# Patient Record
Sex: FEMALE | URBAN - METROPOLITAN AREA
[De-identification: names, ages, dates, MRNs, and addresses within clinical notes are randomized per-mention and may not be internally consistent; named-entity substitution may affect disease eponyms.]

---

## 2019-06-18 ENCOUNTER — APPOINTMENT (OUTPATIENT)
Age: 54
Setting detail: DERMATOLOGY
End: 2019-06-19

## 2019-06-18 DIAGNOSIS — L259 CONTACT DERMATITIS AND OTHER ECZEMA, UNSPECIFIED CAUSE: ICD-10-CM

## 2019-06-18 DIAGNOSIS — H01.13 ECZEMATOUS DERMATITIS OF EYELID: ICD-10-CM

## 2019-06-18 PROBLEM — H01.131 ECZEMATOUS DERMATITIS OF RIGHT UPPER EYELID: Status: ACTIVE | Noted: 2019-06-18

## 2019-06-18 PROBLEM — L23.9 ALLERGIC CONTACT DERMATITIS, UNSPECIFIED CAUSE: Status: ACTIVE | Noted: 2019-06-18

## 2019-06-18 PROBLEM — H01.134 ECZEMATOUS DERMATITIS OF LEFT UPPER EYELID: Status: ACTIVE | Noted: 2019-06-18

## 2019-06-18 PROCEDURE — 99214 OFFICE O/P EST MOD 30 MIN: CPT

## 2019-06-18 PROCEDURE — OTHER MIPS QUALITY: OTHER

## 2019-06-18 PROCEDURE — OTHER PRESCRIPTION: OTHER

## 2019-06-18 PROCEDURE — OTHER TREATMENT REGIMEN: OTHER

## 2019-06-18 PROCEDURE — OTHER COUNSELING: OTHER

## 2019-06-18 RX ORDER — HYDROCORTISONE 25 MG/G
CREAM TOPICAL
Qty: 1 | Refills: 0 | Status: ERX | COMMUNITY
Start: 2019-06-18

## 2019-06-18 RX ORDER — EMOLLIENT COMBINATION NO.32
EMULSION, EXTENDED RELEASE TOPICAL
Qty: 1 | Refills: 2 | Status: ERX | COMMUNITY
Start: 2019-06-18

## 2019-06-18 ASSESSMENT — LOCATION DETAILED DESCRIPTION DERM
LOCATION DETAILED: RIGHT LATERAL SUPERIOR EYELID
LOCATION DETAILED: LEFT MEDIAL SUPERIOR EYELID

## 2019-06-18 ASSESSMENT — LOCATION ZONE DERM: LOCATION ZONE: EYELID

## 2019-06-18 ASSESSMENT — LOCATION SIMPLE DESCRIPTION DERM
LOCATION SIMPLE: LEFT SUPERIOR EYELID
LOCATION SIMPLE: RIGHT SUPERIOR EYELID

## 2019-06-18 NOTE — PROCEDURE: TREATMENT REGIMEN
Initiate Treatment: hydrocortisone 2.5 % topical cream \\nSig: Apply to AA on eyelid BID for 1-2 weeks, then once a day for 1 week, then QOD then discontinue and then reserve for flares\\nEpiceram cream BID-TID\\n\\nEpiCeram topical emulsion, extended release *sent to [facility] \\nSig: Apply to affected areas on face 2-3X a day as needed Initiate Treatment: hydrocortisone 2.5 % topical cream \\nSig: Apply to AA on eyelid BID for 1-2 weeks, then once a day for 1 week, then QOD then discontinue and then reserve for flares\\nEpiceram cream BID-TID\\n\\nEpiCeram topical emulsion, extended release *sent to [facility] \\nSig: Apply to affected areas on face 2-3X a day as needed

## 2019-06-18 NOTE — PROCEDURE: TREATMENT REGIMEN
Modify Regimen: Do not do any more chemical peels or other ski treatments while her eyelid dermatitis is flaring

## 2019-06-18 NOTE — PROCEDURE: TREATMENT REGIMEN
Plan: Per patient she started a new eye shadow palette in March with \"nathan\" colors and the eyelid dermatitis started then. She has since switched back to her MAC eye shadows that she normally used. She is peeling today from a recent chemical peel (she works in a [employer] office). She was instructed to not do chemical peels since she has a rash on her face.\\Mike has her nails done at a nail salon. If the eyelid dermatitis doesn't improve I instructed her to remove the nails. She understood.\\n\\nI advised the patient that Eyelid Dermatitis can be challenging to control. They can flare form allergies, stress, or contact allergens. She was advised to take antihistamines daily, use only mild cleansers and moisturizers and for her face and body,and to use the Epiceram and HC cream in a tapered fashion as prescribed. If not getting any better I would suggest she see an Allergist for allergy testing and PATCH testing next. Recommendations given to her (she lives in [place] so will look for someone closer to her) for allergists. All questions answered. F/U in 6 weeks Plan: Per patient she started a new eye shadow palette in March with \"nathan\" colors and the eyelid dermatitis started then. She has since switched back to her MAC eye shadows that she normally used. She is peeling today from a recent chemical peel (she works in a [employer] office). She was instructed to not do chemical peels since she has a rash on her face.\\Mike has her nails done at a nail salon. If the eyelid dermatitis doesn't improve I instructed her to remove the nails. She understood.\\n\\nI advised the patient that Eyelid Dermatitis can be challenging to control. They can flare form allergies, stress, or contact allergens. She was advised to take antihistamines daily, use only mild cleansers and moisturizers and for her face and body,and to use the Epiceram and HC cream in a tapered fashion as prescribed. If not getting any better I would suggest she see an Allergist for allergy testing and PATCH testing next. Recommendations given to her (she lives in [place] so will look for someone closer to her) for allergists. All questions answered. F/U in 6 weeks

## 2020-01-15 ENCOUNTER — APPOINTMENT (OUTPATIENT)
Age: 55
Setting detail: DERMATOLOGY
End: 2020-01-16

## 2020-01-15 DIAGNOSIS — L92.0 GRANULOMA ANNULARE: ICD-10-CM

## 2020-01-15 DIAGNOSIS — D485 NEOPLASM OF UNCERTAIN BEHAVIOR OF SKIN: ICD-10-CM

## 2020-01-15 DIAGNOSIS — L40.8 OTHER PSORIASIS: ICD-10-CM

## 2020-01-15 PROBLEM — D48.5 NEOPLASM OF UNCERTAIN BEHAVIOR OF SKIN: Status: ACTIVE | Noted: 2020-01-15

## 2020-01-15 PROCEDURE — OTHER BIOPSY BY SHAVE METHOD: OTHER

## 2020-01-15 PROCEDURE — 99213 OFFICE O/P EST LOW 20 MIN: CPT | Mod: 25

## 2020-01-15 PROCEDURE — OTHER MIPS QUALITY: OTHER

## 2020-01-15 PROCEDURE — OTHER COUNSELING: OTHER

## 2020-01-15 PROCEDURE — OTHER TREATMENT REGIMEN: OTHER

## 2020-01-15 PROCEDURE — 11102 TANGNTL BX SKIN SINGLE LES: CPT

## 2020-01-15 ASSESSMENT — SEVERITY ASSESSMENT: SEVERITY: MILD

## 2020-01-15 ASSESSMENT — LOCATION DETAILED DESCRIPTION DERM
LOCATION DETAILED: LEFT ANTERIOR DISTAL UPPER ARM
LOCATION DETAILED: RIGHT ANTERIOR DISTAL UPPER ARM
LOCATION DETAILED: RIGHT LATERAL ABDOMEN

## 2020-01-15 ASSESSMENT — PGA PSORIASIS: PGA PSORIASIS: MINIMAL (MINIMAL PLAQUE ELEVATION, FAINT ERYTHEMA, OCCASIONAL FINE SCALE)

## 2020-01-15 ASSESSMENT — LOCATION ZONE DERM
LOCATION ZONE: ARM
LOCATION ZONE: TRUNK

## 2020-01-15 ASSESSMENT — LOCATION SIMPLE DESCRIPTION DERM
LOCATION SIMPLE: RIGHT UPPER ARM
LOCATION SIMPLE: LEFT UPPER ARM
LOCATION SIMPLE: ABDOMEN

## 2020-01-15 ASSESSMENT — PAIN INTENSITY VAS: HOW INTENSE IS YOUR PAIN 0 BEING NO PAIN, 10 BEING THE MOST SEVERE PAIN POSSIBLE?: NO PAIN

## 2020-01-15 ASSESSMENT — BSA RASH: BSA RASH: 2

## 2020-01-15 NOTE — PROCEDURE: BIOPSY BY SHAVE METHOD
Electrodesiccation Text: The wound bed was treated with electrodesiccation after the biopsy was performed.
Hemostasis: Drysol
Wound Care: Vaseline
Bill For Surgical Tray: no
Additional Anesthesia Volume In Cc (Will Not Render If 0): 0
Biopsy Method: double edge Personna blade
Post-Care Instructions: I reviewed with the patient in detail post-care instructions. Patient is to keep the biopsy site dry overnight, and then apply bacitracin twice daily until healed. Patient may apply hydrogen peroxide soaks to remove any crusting.
Detail Level: Detailed
Consent: Written consent was obtained and risks were reviewed including but not limited to scarring, infection, bleeding, scabbing, incomplete removal, nerve damage and allergy to anesthesia.
Anesthesia Type: 1% lidocaine with epinephrine and a 1:10 solution of 8.4% sodium bicarbonate
Depth Of Biopsy: dermis
Curettage Text: The wound bed was treated with curettage after the biopsy was performed.
Cryotherapy Text: The wound bed was treated with cryotherapy after the biopsy was performed.
Billing Type: Third-Party Bill
Notification Instructions: Patient will be notified of biopsy results. However, patient instructed to call the office if not contacted within 2 weeks.
Anesthesia Volume In Cc (Will Not Render If 0): 0.3
Biopsy Type: H and E
Dressing: Band-Aid
Was A Bandage Applied: Yes
Electrodesiccation And Curettage Text: The wound bed was treated with electrodesiccation and curettage after the biopsy was performed.
Silver Nitrate Text: The wound bed was treated with silver nitrate after the biopsy was performed.
Type Of Destruction Used: Electrodesiccation

## 2020-01-15 NOTE — PROCEDURE: TREATMENT REGIMEN
Plan: No treatment at this point.  We are going to do a biopsy to determine what type dermatitis it is then we can decide how to treat.
Otc Regimen: Moisturizers and mild hc 2.5% cream is ok to apply to area for now until we get the report back.
Detail Level: Simple
Plan: apply Hydrocortisone cream 2.5% for 2 weeks. if that doesn't work then recommend [place] kenalog injections.

## 2020-01-20 ENCOUNTER — RX ONLY (RX ONLY)
Age: 55
End: 2020-01-20

## 2020-01-20 RX ORDER — HYDROCORTISONE 25 MG/G
CREAM TOPICAL
Qty: 1 | Refills: 1 | Status: ERX | COMMUNITY
Start: 2020-01-20